# Patient Record
Sex: MALE | Race: WHITE | URBAN - METROPOLITAN AREA
[De-identification: names, ages, dates, MRNs, and addresses within clinical notes are randomized per-mention and may not be internally consistent; named-entity substitution may affect disease eponyms.]

---
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
## 2024-07-26 NOTE — PLAN OF CARE
"Problem: Depressive Symptoms  Goal: Depressive Symptoms  Signs and symptoms of listed problems will be absent or manageable.   Patient, prior to discharge, will:  -verbalize decrease in depressive signs/symptoms  -verbalize a decrease in anxiety   -verbalize an understanding of medication regimen   -verbalize absence of SI/SIB   -develop a safety plan  -identify a support system   -will participate in coordination of discharge planning    To promote safety/ mental health    Patient identified the following   Triggers:    Wellness Strategies:    Warning Signs:      Feedback (people they would like to receive feedback from if early warning signs):  Friend(s)    Family(s):     Partner/Spouse:     Support Group Member(s):     Co-Worker(s):     Taking Action:    Ways to [place]:      Self-Reflection & Planning.  Assessed patient s progress completing forms related to Illness Management Recovery (including Personal Plan of Care, Adult Coping Plan, and My Support and Coping Plan) and assisted as needed.    Encouraged patient to continue to consider triggers, wellness strategies, early warning signs, feedback from others, actions to take to prevent relapse, and coping strategies as part of a plan to remain well after leaving the hospital.         Outcome: Improving  48 hour: Pt had a smile on his face this morning when this RN talked to him. Pt agreed that he should quit pot and concentrate more on school and \"getting his life back together\". Pt remains slow to respond to questions but admits to depression as slowing him down a bit. Pt otherwise stating he wants to get better and make things work. Pt easily redirectable at this time.       " Principal Discharge DX:	Seizure   1 Principal Discharge DX:	Seizure  Secondary Diagnosis:	Hyperglycemia